# Patient Record
Sex: FEMALE
[De-identification: names, ages, dates, MRNs, and addresses within clinical notes are randomized per-mention and may not be internally consistent; named-entity substitution may affect disease eponyms.]

---

## 2018-11-28 ENCOUNTER — APPOINTMENT (OUTPATIENT)
Dept: FAMILY MEDICINE | Facility: CLINIC | Age: 53
End: 2018-11-28
Payer: COMMERCIAL

## 2018-11-28 VITALS
BODY MASS INDEX: 23.9 KG/M2 | HEIGHT: 64 IN | HEART RATE: 80 BPM | SYSTOLIC BLOOD PRESSURE: 130 MMHG | DIASTOLIC BLOOD PRESSURE: 90 MMHG | WEIGHT: 140 LBS

## 2018-11-28 DIAGNOSIS — F17.210 NICOTINE DEPENDENCE, CIGARETTES, UNCOMPLICATED: ICD-10-CM

## 2018-11-28 DIAGNOSIS — Z83.3 FAMILY HISTORY OF DIABETES MELLITUS: ICD-10-CM

## 2018-11-28 PROBLEM — Z00.00 ENCOUNTER FOR PREVENTIVE HEALTH EXAMINATION: Status: ACTIVE | Noted: 2018-11-28

## 2018-11-28 PROCEDURE — 99386 PREV VISIT NEW AGE 40-64: CPT

## 2018-11-28 NOTE — PLAN
[FreeTextEntry1] : 1. Surgical clearance - Patient is medically optimized considered low risk for a moderate risk procedure, labs reviewed\par \par 2. Smoking - Patient unwilling to quit smoking at this time, has attempted in the past but states it's her only vice. Patient counseled on the risks of smoking, will reassess willingness to quit at next visit.\par \par 3. HCM - Patient encouraged to have a mammogram and colonoscopy. Patient counseled on the risks of not having screening tests performed. Will follow up and encourage screening at next visit as well.

## 2018-11-28 NOTE — HEALTH RISK ASSESSMENT
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

## 2018-11-28 NOTE — REVIEW OF SYSTEMS
[Fever] : no fever [Chills] : no chills [Night Sweats] : no night sweats [Pain] : no pain [Vision Problems] : no vision problems [Chest Pain] : no chest pain [Palpitations] : no palpitations [Lower Ext Edema] : no lower extremity edema [Shortness Of Breath] : no shortness of breath [Wheezing] : no wheezing [Cough] : no cough [Abdominal Pain] : no abdominal pain [Nausea] : no nausea [Constipation] : no constipation [Diarrhea] : diarrhea [Vomiting] : no vomiting [Dysuria] : no dysuria [Incontinence] : no incontinence [Hematuria] : no hematuria [Frequency] : no frequency [Joint Pain] : no joint pain [Joint Stiffness] : no joint stiffness [Muscle Pain] : no muscle pain [Itching] : no itching [Skin Rash] : no skin rash [Headache] : no headache [Dizziness] : no dizziness [Memory Loss] : no memory loss [Suicidal] : not suicidal [Insomnia] : no insomnia [Anxiety] : no anxiety

## 2018-11-28 NOTE — ASSESSMENT
[FreeTextEntry1] : 53 year old female coming in to establish care and for surgical clearance for laparoscopic removal of 2 renal cysts along right kidney scheduled for 12/4 with Dr. Crow Ray.

## 2018-11-28 NOTE — PHYSICAL EXAM
[No Acute Distress] : no acute distress [Well Nourished] : well nourished [Well Developed] : well developed [Well-Appearing] : well-appearing [EOMI] : extraocular movements intact [Normal Oropharynx] : the oropharynx was normal [No JVD] : no jugular venous distention [Normal Rate] : normal rate  [Regular Rhythm] : with a regular rhythm [Normal S1, S2] : normal S1 and S2 [Soft] : abdomen soft [Non Tender] : non-tender [Non-distended] : non-distended [No HSM] : no HSM [Normal Bowel Sounds] : normal bowel sounds [No CVA Tenderness] : no CVA  tenderness [No Spinal Tenderness] : no spinal tenderness [No Joint Swelling] : no joint swelling [No Rash] : no rash [Normal Gait] : normal gait [Normal Insight/Judgement] : insight and judgment were intact [de-identified] : + systolic murmur heard along right upper sternal border

## 2018-11-28 NOTE — HISTORY OF PRESENT ILLNESS
[FreeTextEntry1] : 53 year old female coming in to establish care and for surgical clearance for laparoscopic removal of 2 cysts along right kidney scheduled for 12/4 with Dr. Crow Ray.  [de-identified] : 53 year old female with no significant PMH coming in to establish care and for surgical clearance for laparoscopic cyst removal along right kidney scheduled for 12/4 with Dr. Crow Ray. Patient has no acute complaints at this time. Patient has a 45 pack year smoking history and drinks socially. Patient has not had a mammogram or colonoscopy recently. Patient visited pre-surgical testing at [facility] where she was found to have an asymptomatic UTI and has completed a 5 day course of bactrim given she scheduled surgery.